# Patient Record
Sex: MALE | Race: WHITE | Employment: FULL TIME | URBAN - METROPOLITAN AREA
[De-identification: names, ages, dates, MRNs, and addresses within clinical notes are randomized per-mention and may not be internally consistent; named-entity substitution may affect disease eponyms.]

---

## 2023-04-25 ENCOUNTER — APPOINTMENT (OUTPATIENT)
Dept: RADIOLOGY | Facility: MEDICAL CENTER | Age: 73
DRG: 123 | End: 2023-04-25
Attending: EMERGENCY MEDICINE
Payer: COMMERCIAL

## 2023-04-25 ENCOUNTER — HOSPITAL ENCOUNTER (INPATIENT)
Facility: MEDICAL CENTER | Age: 73
LOS: 1 days | DRG: 123 | End: 2023-04-26
Attending: EMERGENCY MEDICINE | Admitting: FAMILY MEDICINE
Payer: COMMERCIAL

## 2023-04-25 ENCOUNTER — APPOINTMENT (OUTPATIENT)
Dept: CARDIOLOGY | Facility: MEDICAL CENTER | Age: 73
DRG: 123 | End: 2023-04-25
Attending: STUDENT IN AN ORGANIZED HEALTH CARE EDUCATION/TRAINING PROGRAM
Payer: COMMERCIAL

## 2023-04-25 ENCOUNTER — APPOINTMENT (OUTPATIENT)
Dept: RADIOLOGY | Facility: MEDICAL CENTER | Age: 73
DRG: 123 | End: 2023-04-25
Attending: STUDENT IN AN ORGANIZED HEALTH CARE EDUCATION/TRAINING PROGRAM
Payer: COMMERCIAL

## 2023-04-25 DIAGNOSIS — H34.11 CENTRAL RETINAL ARTERY OCCLUSION OF RIGHT EYE: ICD-10-CM

## 2023-04-25 PROBLEM — K21.9 CHRONIC GERD: Status: ACTIVE | Noted: 2023-04-25

## 2023-04-25 PROBLEM — I10 ESSENTIAL HYPERTENSION: Status: ACTIVE | Noted: 2023-04-25

## 2023-04-25 PROBLEM — H34.10 CENTRAL RETINAL ARTERY OCCLUSION: Status: ACTIVE | Noted: 2023-04-25

## 2023-04-25 LAB
ALBUMIN SERPL BCP-MCNC: 4.4 G/DL (ref 3.2–4.9)
ALBUMIN/GLOB SERPL: 1.7 G/DL
ALP SERPL-CCNC: 87 U/L (ref 30–99)
ALT SERPL-CCNC: 20 U/L (ref 2–50)
ANION GAP SERPL CALC-SCNC: 11 MMOL/L (ref 7–16)
APTT PPP: 26.3 SEC (ref 24.7–36)
AST SERPL-CCNC: 26 U/L (ref 12–45)
BASOPHILS # BLD AUTO: 0.5 % (ref 0–1.8)
BASOPHILS # BLD: 0.03 K/UL (ref 0–0.12)
BILIRUB SERPL-MCNC: 0.6 MG/DL (ref 0.1–1.5)
BUN SERPL-MCNC: 13 MG/DL (ref 8–22)
CALCIUM ALBUM COR SERPL-MCNC: 9.2 MG/DL (ref 8.5–10.5)
CALCIUM SERPL-MCNC: 9.5 MG/DL (ref 8.5–10.5)
CHLORIDE SERPL-SCNC: 106 MMOL/L (ref 96–112)
CO2 SERPL-SCNC: 21 MMOL/L (ref 20–33)
CREAT SERPL-MCNC: 0.82 MG/DL (ref 0.5–1.4)
CRP SERPL HS-MCNC: <0.3 MG/DL (ref 0–0.75)
EKG IMPRESSION: NORMAL
EOSINOPHIL # BLD AUTO: 0.25 K/UL (ref 0–0.51)
EOSINOPHIL NFR BLD: 4.1 % (ref 0–6.9)
ERYTHROCYTE [DISTWIDTH] IN BLOOD BY AUTOMATED COUNT: 43.8 FL (ref 35.9–50)
EST. AVERAGE GLUCOSE BLD GHB EST-MCNC: 120 MG/DL
GFR SERPLBLD CREATININE-BSD FMLA CKD-EPI: 94 ML/MIN/1.73 M 2
GLOBULIN SER CALC-MCNC: 2.6 G/DL (ref 1.9–3.5)
GLUCOSE SERPL-MCNC: 94 MG/DL (ref 65–99)
HBA1C MFR BLD: 5.8 % (ref 4–5.6)
HCT VFR BLD AUTO: 44.1 % (ref 42–52)
HGB BLD-MCNC: 14.3 G/DL (ref 14–18)
IMM GRANULOCYTES # BLD AUTO: 0.03 K/UL (ref 0–0.11)
IMM GRANULOCYTES NFR BLD AUTO: 0.5 % (ref 0–0.9)
INR PPP: 1.08 (ref 0.87–1.13)
LV EJECT FRACT  99904: 55
LYMPHOCYTES # BLD AUTO: 2.39 K/UL (ref 1–4.8)
LYMPHOCYTES NFR BLD: 39 % (ref 22–41)
MCH RBC QN AUTO: 30.5 PG (ref 27–33)
MCHC RBC AUTO-ENTMCNC: 32.4 G/DL (ref 33.7–35.3)
MCV RBC AUTO: 94 FL (ref 81.4–97.8)
MONOCYTES # BLD AUTO: 0.6 K/UL (ref 0–0.85)
MONOCYTES NFR BLD AUTO: 9.8 % (ref 0–13.4)
NEUTROPHILS # BLD AUTO: 2.83 K/UL (ref 1.82–7.42)
NEUTROPHILS NFR BLD: 46.1 % (ref 44–72)
NRBC # BLD AUTO: 0 K/UL
NRBC BLD-RTO: 0 /100 WBC
PLATELET # BLD AUTO: 239 K/UL (ref 164–446)
PMV BLD AUTO: 9.2 FL (ref 9–12.9)
POTASSIUM SERPL-SCNC: 4.5 MMOL/L (ref 3.6–5.5)
PROT SERPL-MCNC: 7 G/DL (ref 6–8.2)
PROTHROMBIN TIME: 13.9 SEC (ref 12–14.6)
RBC # BLD AUTO: 4.69 M/UL (ref 4.7–6.1)
SODIUM SERPL-SCNC: 138 MMOL/L (ref 135–145)
WBC # BLD AUTO: 6.1 K/UL (ref 4.8–10.8)

## 2023-04-25 PROCEDURE — 93306 TTE W/DOPPLER COMPLETE: CPT | Mod: 26 | Performed by: INTERNAL MEDICINE

## 2023-04-25 PROCEDURE — 93306 TTE W/DOPPLER COMPLETE: CPT

## 2023-04-25 PROCEDURE — 36415 COLL VENOUS BLD VENIPUNCTURE: CPT

## 2023-04-25 PROCEDURE — 770020 HCHG ROOM/CARE - TELE (206)

## 2023-04-25 PROCEDURE — 85730 THROMBOPLASTIN TIME PARTIAL: CPT

## 2023-04-25 PROCEDURE — A9270 NON-COVERED ITEM OR SERVICE: HCPCS | Performed by: STUDENT IN AN ORGANIZED HEALTH CARE EDUCATION/TRAINING PROGRAM

## 2023-04-25 PROCEDURE — 700117 HCHG RX CONTRAST REV CODE 255: Performed by: EMERGENCY MEDICINE

## 2023-04-25 PROCEDURE — 85652 RBC SED RATE AUTOMATED: CPT

## 2023-04-25 PROCEDURE — 80053 COMPREHEN METABOLIC PANEL: CPT

## 2023-04-25 PROCEDURE — 700102 HCHG RX REV CODE 250 W/ 637 OVERRIDE(OP): Performed by: STUDENT IN AN ORGANIZED HEALTH CARE EDUCATION/TRAINING PROGRAM

## 2023-04-25 PROCEDURE — 700105 HCHG RX REV CODE 258: Performed by: STUDENT IN AN ORGANIZED HEALTH CARE EDUCATION/TRAINING PROGRAM

## 2023-04-25 PROCEDURE — 70496 CT ANGIOGRAPHY HEAD: CPT

## 2023-04-25 PROCEDURE — 70498 CT ANGIOGRAPHY NECK: CPT

## 2023-04-25 PROCEDURE — 70551 MRI BRAIN STEM W/O DYE: CPT

## 2023-04-25 PROCEDURE — 85025 COMPLETE CBC W/AUTO DIFF WBC: CPT

## 2023-04-25 PROCEDURE — 86140 C-REACTIVE PROTEIN: CPT

## 2023-04-25 PROCEDURE — 83036 HEMOGLOBIN GLYCOSYLATED A1C: CPT

## 2023-04-25 PROCEDURE — 85610 PROTHROMBIN TIME: CPT

## 2023-04-25 PROCEDURE — 93005 ELECTROCARDIOGRAM TRACING: CPT | Performed by: EMERGENCY MEDICINE

## 2023-04-25 PROCEDURE — 99285 EMERGENCY DEPT VISIT HI MDM: CPT

## 2023-04-25 RX ORDER — PANTOPRAZOLE SODIUM 40 MG/1
40 TABLET, DELAYED RELEASE ORAL DAILY
COMMUNITY

## 2023-04-25 RX ORDER — ONDANSETRON 4 MG/1
4 TABLET, ORALLY DISINTEGRATING ORAL EVERY 4 HOURS PRN
Status: DISCONTINUED | OUTPATIENT
Start: 2023-04-25 | End: 2023-04-26 | Stop reason: HOSPADM

## 2023-04-25 RX ORDER — LISINOPRIL 10 MG/1
10 TABLET ORAL
Status: DISCONTINUED | OUTPATIENT
Start: 2023-04-25 | End: 2023-04-26 | Stop reason: HOSPADM

## 2023-04-25 RX ORDER — OMEPRAZOLE 20 MG/1
20 CAPSULE, DELAYED RELEASE ORAL DAILY
Status: DISCONTINUED | OUTPATIENT
Start: 2023-04-26 | End: 2023-04-26 | Stop reason: HOSPADM

## 2023-04-25 RX ORDER — CLOPIDOGREL BISULFATE 75 MG/1
75 TABLET ORAL DAILY
COMMUNITY

## 2023-04-25 RX ORDER — PANTOPRAZOLE SODIUM 40 MG/1
40 TABLET, DELAYED RELEASE ORAL DAILY
Status: DISCONTINUED | OUTPATIENT
Start: 2023-04-25 | End: 2023-04-25

## 2023-04-25 RX ORDER — ONDANSETRON 4 MG/1
4 TABLET, ORALLY DISINTEGRATING ORAL 2 TIMES DAILY PRN
COMMUNITY

## 2023-04-25 RX ORDER — POLYETHYLENE GLYCOL 3350 17 G/17G
1 POWDER, FOR SOLUTION ORAL
Status: DISCONTINUED | OUTPATIENT
Start: 2023-04-25 | End: 2023-04-26 | Stop reason: HOSPADM

## 2023-04-25 RX ORDER — ROSUVASTATIN CALCIUM 20 MG/1
40 TABLET, COATED ORAL DAILY
Status: DISCONTINUED | OUTPATIENT
Start: 2023-04-25 | End: 2023-04-26 | Stop reason: HOSPADM

## 2023-04-25 RX ORDER — QUINAPRIL 5 1/1
5 TABLET ORAL 2 TIMES DAILY
Status: ON HOLD | COMMUNITY
End: 2023-04-26 | Stop reason: SDUPTHER

## 2023-04-25 RX ORDER — ONDANSETRON 2 MG/ML
4 INJECTION INTRAMUSCULAR; INTRAVENOUS EVERY 4 HOURS PRN
Status: DISCONTINUED | OUTPATIENT
Start: 2023-04-25 | End: 2023-04-26 | Stop reason: HOSPADM

## 2023-04-25 RX ORDER — OXYCODONE HCL 20 MG/1
20 TABLET, FILM COATED, EXTENDED RELEASE ORAL EVERY 12 HOURS
COMMUNITY

## 2023-04-25 RX ORDER — LABETALOL HYDROCHLORIDE 5 MG/ML
10 INJECTION, SOLUTION INTRAVENOUS EVERY 4 HOURS PRN
Status: DISCONTINUED | OUTPATIENT
Start: 2023-04-25 | End: 2023-04-26 | Stop reason: HOSPADM

## 2023-04-25 RX ORDER — OXYCODONE HYDROCHLORIDE 10 MG/1
10-20 TABLET ORAL EVERY 4 HOURS PRN
Status: DISCONTINUED | OUTPATIENT
Start: 2023-04-25 | End: 2023-04-26 | Stop reason: HOSPADM

## 2023-04-25 RX ORDER — OXYCODONE HYDROCHLORIDE 10 MG/1
10-20 TABLET ORAL EVERY 4 HOURS PRN
COMMUNITY

## 2023-04-25 RX ORDER — AMOXICILLIN 250 MG
2 CAPSULE ORAL 2 TIMES DAILY
Status: DISCONTINUED | OUTPATIENT
Start: 2023-04-25 | End: 2023-04-26 | Stop reason: HOSPADM

## 2023-04-25 RX ORDER — BISACODYL 10 MG
10 SUPPOSITORY, RECTAL RECTAL
Status: DISCONTINUED | OUTPATIENT
Start: 2023-04-25 | End: 2023-04-26 | Stop reason: HOSPADM

## 2023-04-25 RX ORDER — SODIUM CHLORIDE, SODIUM LACTATE, POTASSIUM CHLORIDE, CALCIUM CHLORIDE 600; 310; 30; 20 MG/100ML; MG/100ML; MG/100ML; MG/100ML
INJECTION, SOLUTION INTRAVENOUS CONTINUOUS
Status: DISCONTINUED | OUTPATIENT
Start: 2023-04-25 | End: 2023-04-25

## 2023-04-25 RX ORDER — OXYCODONE HCL 20 MG/1
20 TABLET, FILM COATED, EXTENDED RELEASE ORAL EVERY 12 HOURS
Status: DISCONTINUED | OUTPATIENT
Start: 2023-04-25 | End: 2023-04-26 | Stop reason: HOSPADM

## 2023-04-25 RX ORDER — ROSUVASTATIN CALCIUM 40 MG/1
40 TABLET, COATED ORAL DAILY
COMMUNITY

## 2023-04-25 RX ORDER — CLOPIDOGREL BISULFATE 75 MG/1
75 TABLET ORAL DAILY
Status: DISCONTINUED | OUTPATIENT
Start: 2023-04-26 | End: 2023-04-26 | Stop reason: HOSPADM

## 2023-04-25 RX ORDER — QUINAPRIL 5 1/1
5 TABLET ORAL 2 TIMES DAILY
Status: DISCONTINUED | OUTPATIENT
Start: 2023-04-25 | End: 2023-04-25

## 2023-04-25 RX ORDER — CELECOXIB 200 MG/1
200 CAPSULE ORAL 2 TIMES DAILY PRN
COMMUNITY

## 2023-04-25 RX ADMIN — OXYCODONE HYDROCHLORIDE 20 MG: 20 TABLET, FILM COATED, EXTENDED RELEASE ORAL at 18:01

## 2023-04-25 RX ADMIN — IOHEXOL 100 ML: 350 INJECTION, SOLUTION INTRAVENOUS at 13:52

## 2023-04-25 RX ADMIN — LISINOPRIL 10 MG: 10 TABLET ORAL at 17:01

## 2023-04-25 RX ADMIN — SODIUM CHLORIDE, POTASSIUM CHLORIDE, SODIUM LACTATE AND CALCIUM CHLORIDE: 600; 310; 30; 20 INJECTION, SOLUTION INTRAVENOUS at 16:55

## 2023-04-25 RX ADMIN — OXYCODONE HYDROCHLORIDE 20 MG: 10 TABLET ORAL at 21:10

## 2023-04-25 RX ADMIN — ROSUVASTATIN CALCIUM 40 MG: 20 TABLET, FILM COATED ORAL at 17:01

## 2023-04-25 ASSESSMENT — PATIENT HEALTH QUESTIONNAIRE - PHQ9
1. LITTLE INTEREST OR PLEASURE IN DOING THINGS: NOT AT ALL
SUM OF ALL RESPONSES TO PHQ9 QUESTIONS 1 AND 2: 0
2. FEELING DOWN, DEPRESSED, IRRITABLE, OR HOPELESS: NOT AT ALL

## 2023-04-25 ASSESSMENT — LIFESTYLE VARIABLES
TOTAL SCORE: 0
DOES PATIENT WANT TO STOP DRINKING: NO
CONSUMPTION TOTAL: NEGATIVE
AVERAGE NUMBER OF DAYS PER WEEK YOU HAVE A DRINK CONTAINING ALCOHOL: 0
EVER FELT BAD OR GUILTY ABOUT YOUR DRINKING: NO
ON A TYPICAL DAY WHEN YOU DRINK ALCOHOL HOW MANY DRINKS DO YOU HAVE: 0
EVER HAD A DRINK FIRST THING IN THE MORNING TO STEADY YOUR NERVES TO GET RID OF A HANGOVER: NO
HAVE YOU EVER FELT YOU SHOULD CUT DOWN ON YOUR DRINKING: NO
HAVE PEOPLE ANNOYED YOU BY CRITICIZING YOUR DRINKING: NO
HOW MANY TIMES IN THE PAST YEAR HAVE YOU HAD 5 OR MORE DRINKS IN A DAY: 0
TOTAL SCORE: 0
TOTAL SCORE: 0
ALCOHOL_USE: NO

## 2023-04-25 ASSESSMENT — COGNITIVE AND FUNCTIONAL STATUS - GENERAL
DAILY ACTIVITIY SCORE: 24
SUGGESTED CMS G CODE MODIFIER DAILY ACTIVITY: CH
SUGGESTED CMS G CODE MODIFIER MOBILITY: CH
MOBILITY SCORE: 24

## 2023-04-25 ASSESSMENT — PAIN DESCRIPTION - PAIN TYPE
TYPE: ACUTE PAIN
TYPE: CHRONIC PAIN

## 2023-04-25 ASSESSMENT — FIBROSIS 4 INDEX
FIB4 SCORE: 1.73
FIB4 SCORE: 1.73

## 2023-04-25 NOTE — ED NOTES
Break RN:  Pt given water per ERP and is tolerating well. Pt also updated regarding plan of care and pending admission to hospital, demonstrated understanding.

## 2023-04-25 NOTE — ED NOTES
Report to RALPH Abel. Plan for patient to go to MRI first then [id number]. Patient agreeable to plan.

## 2023-04-25 NOTE — ED TRIAGE NOTES
"Chief Complaint   Patient presents with    Sent by MD            Pt reports that he was sent by his opthalmologic for decreased vision in right eye. Reports that she saw a \"blocked artery that was either a stroke or going to be a stroke.\"  Pt reports symptoms have been present 1-2 months.  Reports no other neuro deficits. No unilateral weakness.   /77   Pulse 68   Temp 36.7 °C (98.1 °F) (Temporal)   Resp 14   Wt 101 kg (223 lb 5.2 oz)   SpO2 95%   Pt informed of wait times. Educated on triage process.  Asked to return to triage RN for any new or worsening of symptoms. Thanked for patience.      "

## 2023-04-25 NOTE — H&P
"[facility] MEDICINE HISTORY AND PHYSICAL     PATIENT ID:  NAME:  William Serrano  MRN:               [id number]  YOB: 1950    Date of Admission: 4/25/2023     Attending: Remigio Hernandez MD    Resident: Brittany Sutton MD     Primary Care Physician:  Pcp Pt States None    CC: Vision loss    HPI: William Serrano is a 72 y.o. male with past medical history significant for cataracts, retinal detachment x3, left retinal hemorrhage, L eye blindness, drusen of both optic discs, HC, HTN, and GERD admitted on 4/25/2023 for decreased vision in right eye secondary to retinal artery branch occlusion (CRAO) versus branch retinal artery occlusion (BRAO) versus giant cell arteritis.     Patient has a history of being blind in his left eye. Additionally, he is 40% vision in his right eye and describes his vision as \"a tunnel.\"  Over the last 1 to 2 months, he has had changes in his right eye vision which includes an inability to see distance and all reading.  This acute change in his vision prompted an appointment with his ophthalmologist yesterday.  His ophthalmology recommended he be evaluated in the emergency department yesterday as she was conerned for CRAO versus BRAO \"due to retinal whitening note on exam, with edema, also includes GCA as patient reports increased headaches\" (oer Dr. Menjivar office visit note). He says that he has constant headaches because of his poor vision and the headaches worsen while he attempts to read.  He denies any eye pain.  However, reports nausea when trying to read.  He says he takes Zofran at home for the nausea.  He denies fever, chills, night sweats, unexpected weight loss, diarrhea constipation and vomiting.    The follow exams were completed by the patient's ophthalmologist, Dr. Menjivar:  \"Slit Lamp Exam   Right Left   Lids/Lashes Normal Normal   Conjunctiva/Sclera White and quiet White and quiet   Cornea Clear Clear   Anterior Chamber Deep and quiet, no cell Deep and quiet, " "no cell   Iris Round and reactive, no neovascularization Round and reactive   Lens subluxed superior PCO 2+ subluxed inferior, Posterior capsular opacification trace   Anterior Vitreous Normal, no cells Normal, no cells   Fundus Exam   Right Left   Disc drusen, Peripapillary atrophy Pallor, Peripapillary atrophy   C/D Ratio elevated elevated   Macula Retinal pigment epithelial mottling, elevation, slight pallor with edema, microaneurysms, possible choroidal neovascularization significant distortion, RPE atrophy; large central heme   Vessels Microaneurysms superior arcade, Arteriolar narrowing Arteriolar narrowing   Periphery scleral buckle scleral buckle\"    ERCourse:  Patient was evaluated in the ER for CRAO identified on funduscopic examination.  He underwent CTA to evaluate for possible lead point for stroke in his head and neck which are within normal limits.  CBC and CMP were reassuring.  EKG revealed normal sinus rhythm.  Patient admitted for echocardiogram and ongoing telemetry monitoring.    REVIEW OF SYSTEMS:   Ten systems reviewed and were negative except as noted in the HPI.                PAST MEDICAL HISTORY:  Past Medical History:   Diagnosis Date    Retinal detachment        PAST SURGICAL HISTORY:  History reviewed. No pertinent surgical history.    FAMILY HISTORY:  History reviewed. No pertinent family history.    SOCIAL HISTORY:   Social History:   Tobacco: Denied  Alcohol: Denied  Recreational drugs (illegal and prescription): Denied  Employment: Patient works remotely in [place], [place].  Activity Level: Independent with activities of daily living.  Living situation: He lives with his wife who has breast cancer in [place].  Primary Care Provider: Denied    DIET:   Orders Placed This Encounter   Procedures    Diet NPO Restrict to: Sips with Medications     Standing Status:   Standing     Number of Occurrences:   1     Order Specific Question:   Diet NPO Restrict to:     Answer:   Sips with " Medications [3]       ALLERGIES:  No Known Allergies    OUTPATIENT MEDICATIONS:    Current Facility-Administered Medications:     senna-docusate (PERICOLACE or SENOKOT S) 8.6-50 MG per tablet 2 Tablet, 2 Tablet, Oral, BID **AND** polyethylene glycol/lytes (MIRALAX) PACKET 1 Packet, 1 Packet, Oral, QDAY PRN **AND** magnesium hydroxide (MILK OF MAGNESIA) suspension 30 mL, 30 mL, Oral, QDAY PRN **AND** bisacodyl (DULCOLAX) suppository 10 mg, 10 mg, Rectal, QDAY PRN, Remigio Hernandez M.D.    ondansetron (ZOFRAN) syringe/vial injection 4 mg, 4 mg, Intravenous, Q4HRS PRN, Remigio Hernandez M.D.    ondansetron (ZOFRAN ODT) dispertab 4 mg, 4 mg, Oral, Q4HRS PRN, Remigio Hernandez M.D.    labetalol (NORMODYNE/TRANDATE) injection 10 mg, 10 mg, Intravenous, Q4HRS PRN, Remigio Hernandez M.D.    rosuvastatin (CRESTOR) tablet 40 mg, 40 mg, Oral, DAILY, Brittany Sutton M.D.    [START ON 4/26/2023] clopidogrel (PLAVIX) tablet 75 mg, 75 mg, Oral, DAILY, Brittany Sutton M.D.    [START ON 4/26/2023] omeprazole (PRILOSEC) capsule 20 mg, 20 mg, Oral, DAILY, Brittany Sutton M.D.    lisinopril (PRINIVIL) tablet 10 mg, 10 mg, Oral, Q DAY, Brittany Sutton M.D.    lactated ringers infusion, , Intravenous, Continuous, Brittany Sutton M.D.    Current Outpatient Medications:     multivitamin Tab, Take 1 Tablet by mouth every day., Disp: , Rfl:     Multiple Vitamins-Minerals (PRESERVISION AREDS PO), Take 1 Tablet by mouth every evening., Disp: , Rfl:     clopidogrel (PLAVIX) 75 MG Tab, Take 75 mg by mouth every day., Disp: , Rfl:     oxyCODONE immediate release (ROXICODONE) 10 MG immediate release tablet, Take 10-20 mg by mouth every four hours as needed for Moderate Pain., Disp: , Rfl:     oxyCODONE CR (OXYCONTIN) 20 MG Tablet Extended Release 12 hour Abuse-Deterrent, Take 20 mg by mouth every 12 hours., Disp: , Rfl:     celecoxib (CELEBREX) 200 MG Cap, Take 200 mg by mouth 2 times a day as needed (Pain)., Disp: , Rfl:      ondansetron (ZOFRAN ODT) 4 MG TABLET DISPERSIBLE, Take 4 mg by mouth 2 times a day as needed for Nausea/Vomiting., Disp: , Rfl:     pantoprazole (PROTONIX) 40 MG Tablet Delayed Response, Take 40 mg by mouth every day., Disp: , Rfl:     rosuvastatin (CRESTOR) 40 MG tablet, Take 40 mg by mouth every day., Disp: , Rfl:     quinapril (ACCUPRIL) 5 MG Tab, Take 5 mg by mouth 2 times a day., Disp: , Rfl:     PHYSICAL EXAM:  Vitals:    23 1032 23 1035 23 1401 23 1500   BP: 135/77  (!) 151/68 (!) 144/67   Pulse: 68  63 62   Resp: 14  16 16   Temp: 36.7 °C (98.1 °F)      TempSrc: Temporal      SpO2: 95%  92% 93%   Weight:  101 kg (223 lb 5.2 oz)     , Temp (24hrs), Av.7 °C (98.1 °F), Min:36.7 °C (98.1 °F), Max:36.7 °C (98.1 °F)  , Pulse Oximetry: 93 %, O2 (LPM): 0, O2 Delivery Device: None - Room Air    Physical Exam  Constitutional:       General: He is not in acute distress.     Appearance: Normal appearance. He is normal weight. He is not ill-appearing.   HENT:      Head: Atraumatic.   Eyes:      General: Visual field deficit present.   Cardiovascular:      Rate and Rhythm: Normal rate and regular rhythm.      Heart sounds: Normal heart sounds.   Pulmonary:      Effort: Pulmonary effort is normal. No respiratory distress.      Breath sounds: Normal breath sounds.   Abdominal:      General: Abdomen is flat. Bowel sounds are normal.      Palpations: Abdomen is soft.   Skin:     General: Skin is warm and dry.   Neurological:      Mental Status: He is alert.      Cranial Nerves: No cranial nerve deficit, dysarthria or facial asymmetry.      Sensory: Sensation is intact.      Motor: Motor function is intact.      Coordination: Coordination normal. Heel to Shin Test normal.      Comments: NIH 1: For vision loss.            LAB TESTS:   Recent Labs     23  1350   WBC 6.1   RBC 4.69*   HEMOGLOBIN 14.3   HEMATOCRIT 44.1   MCV 94.0   MCH 30.5   RDW 43.8   PLATELETCT 239   MPV 9.2   NEUTSPOLYS 46.10    LYMPHOCYTES 39.00   MONOCYTES 9.80   EOSINOPHILS 4.10   BASOPHILS 0.50         Recent Labs     04/25/23  1232   SODIUM 138   POTASSIUM 4.5   CHLORIDE 106   CO2 21   BUN 13   CREATININE 0.82   CALCIUM 9.5   ALBUMIN 4.4       CULTURES:   Results       ** No results found for the last 168 hours. **            IMAGES:  EC-ECHOCARDIOGRAM COMPLETE W/O CONT         CT-CTA NECK WITH & W/O-POST PROCESSING   Final Result      CT angiogram of the neck within normal limits.      CT-CTA HEAD WITH & W/O-POST PROCESS   Final Result      CT angiogram of the [place] within normal limits.      MR-BRAIN-W/O    (Results Pending)       CONSULTS:   Neurology    ASSESSMENT/PLAN:   William Serrano is a 72 y.o. male with past medical history significant for cataracts, retinal detachment x3, left retinal hemorrhage, L eye blindness, drusen of both optic discs, HC, HTN, and GERD admitted on 4/25/2023 for decreased vision in right eye secondary to retinal artery branch occlusion (CRAO) versus branch retinal artery occlusion (BRAO) versus giant cell arteritis.     * Central retinal artery occlusion- (present on admission)  Assessment & Plan  As per ophthalmology, she is concern for retinal artery branch occlusion of right eye versus possible BRAO due to retinal whitening on exam with edema.  Lead EKG revealed NSR.  CT angiogram of the [place] within normal limits. CT angiogram of the neck within normal limits.  NIH score 1.  - ESR, CRP, CBC (r/o GCA), A1c, PT/PTT ordered per ophthalmology.  Will review upon return.  - MRI brain without ordered.  Will review upon return.  - Echocardiogram ordered.  Will review upon return.  - Neurology consultation pending MRI result.       Chronic GERD  Assessment & Plan  - Continue home pantoprazole    Essential hypertension  Assessment & Plan  Chart review, the patient stopped his blood pressure medication recently and has not seen his primary care physician in over a year.  Blood pressure  is elevated in the emergency department.  - Restart 5 mg of Accupril twice daily      Core Measures:   Fluids: LR @ 125  Lines: IV  Abx: None  DVT prophylaxis: SCD  Code Status: DNR DNI    Brittany Sutton MD  PGY-2  [facility]      This case was discussed with Dr. Remigio Hernandez with [facility]

## 2023-04-25 NOTE — ASSESSMENT & PLAN NOTE
As per ophthalmology, she is concern for retinal artery branch occlusion of right eye versus possible BRAO due to retinal whitening on exam with edema.  Lead EKG revealed NSR.  CT angiogram of the [place] within normal limits. CT angiogram of the neck within normal limits.  NIH score 1. Decreased concern for giant cell arteritis and hypercoagulability based on results of blood work.  - Echocardiogram WNL   - Brain MRI WO revealed no acute abnormality.  - ESR WNL, CRP WNL, CBC WNL, PT/PTT WNL.  - Patient to be referred to outpatient cardiology to discuss Zio patch to assess for AF not captured while in patient  -ABCD2 score 3 points: Low risk. 2-day stroke risk 1%/7 day stroke risk: 1.2%/ 90-day stroke risk 3.1%. Single-agent antiplatelet therapy. As such, will continue home clopidogrel.

## 2023-04-25 NOTE — ED NOTES
Break RN:  Pt back from CT via zandrarlawson. No distress noted and pt's breaths are even and unlabored.

## 2023-04-25 NOTE — CARE PLAN
Problem: Knowledge Deficit - Stroke Education  Goal: Patient's knowledge of stroke and risk factors will improve  Outcome: Progressing   The patient is Stable - Low risk of patient condition declining or worsening    Shift Goals  Clinical Goals: monitor VS  Patient Goals: rest    Progress made toward(s) clinical / shift goals:  Pt educated on POC, all questions answered in regards to disease process, treatment and diet. Pt verbalized understanding and voiced no further questions at this time.

## 2023-04-25 NOTE — ED PROVIDER NOTES
ED Provider Note    CHIEF COMPLAINT  Chief Complaint   Patient presents with    Sent by MD CA/CHITRA  LIMITATION TO HISTORY   none      William Serrano is a 72 y.o. male who presents to in by his ophthalmologist for CRAO.  Patient reports that he has an extensive history of ophthalmologic issues.  He has had bilateral retinal detachment status post repair.  He has apparently calcifications on his retinal nerve causing central blindness in his left eye, and was seen by his ophthalmologist for decreased peripheral vision in his right eye yesterday.  On funduscopic exam patient apparently had findings concerning for a central retinal artery occlusion and patient was told to go directly to the emergency department for further care, however patient did not want to go immediately to the emergency department as he had some dizziness to take care of.  He therefore presents today.  Patient reports decreased vision in the entire periphery of his left eye which is new over the last week or so.  Patient denies any associated weakness or numbness.  He denies any difficulty ambulating.  Patient denies any associated dizziness.    PAST MEDICAL HISTORY   has a past medical history of Retinal detachment.    SURGICAL HISTORY  patient denies any surgical history    FAMILY HISTORY  History reviewed. No pertinent family history.    SOCIAL HISTORY  Social History     Tobacco Use    Smoking status: Never    Smokeless tobacco: Never   Substance and Sexual Activity    Alcohol use: Never    Drug use: Never    Sexual activity: Not on file       CURRENT MEDICATIONS  Home Medications       Reviewed by Andreea Scott RSID (Registered Nurse) on 04/25/23 at 1040  Med List Status: Partial     Medication Last Dose Status        Patient Ash Taking any Medications                           ALLERGIES  No Known Allergies    PHYSICAL EXAM  VITAL SIGNS: /77   Pulse 68   Temp 36.7 °C (98.1 °F) (Temporal)   Resp 14   Wt  101 kg (223 lb 5.2 oz)   SpO2 95%    Physical Exam  Constitutional:       Appearance: Normal appearance.   HENT:      Head: Normocephalic.      Right Ear: Tympanic membrane normal.      Left Ear: Tympanic membrane normal.      Nose: Nose normal.      Mouth/Throat:      Mouth: Mucous membranes are moist.   Eyes:      Extraocular Movements: Extraocular movements intact.      Pupils: Pupils are equal, round, and reactive to light.      Comments: Vision difficulty affected in left lateral quadrants  No strabismus, conjunctiva is normal   Cardiovascular:      Rate and Rhythm: Normal rate and regular rhythm.   Pulmonary:      Effort: Pulmonary effort is normal. No respiratory distress.      Breath sounds: Normal breath sounds. No stridor. No wheezing or rales.   Chest:      Chest wall: No tenderness.   Abdominal:      General: Abdomen is flat. There is no distension.      Palpations: Abdomen is soft. There is no mass.      Tenderness: There is no abdominal tenderness.   Musculoskeletal:      Cervical back: Normal range of motion.   Skin:     General: Skin is warm.      Capillary Refill: Capillary refill takes less than 2 seconds.   Neurological:      General: No focal deficit present.      Mental Status: He is alert and oriented to person, place, and time.   Psychiatric:         Mood and Affect: Mood normal.         DIAGNOSTIC STUDIES / PROCEDURES  EKG  I have independently interpreted this EKG  Ekg is unremarkable    LABS  Results for orders placed or performed during the hospital encounter of 04/25/23   CMP   Result Value Ref Range    Sodium 138 135 - 145 mmol/L    Potassium 4.5 3.6 - 5.5 mmol/L    Chloride 106 96 - 112 mmol/L    Co2 21 20 - 33 mmol/L    Anion Gap 11.0 7.0 - 16.0    Glucose 94 65 - 99 mg/dL    Bun 13 8 - 22 mg/dL    Creatinine 0.82 0.50 - 1.40 mg/dL    Calcium 9.5 8.5 - 10.5 mg/dL    AST(SGOT) 26 12 - 45 U/L    ALT(SGPT) 20 2 - 50 U/L    Alkaline Phosphatase 87 30 - 99 U/L    Total Bilirubin 0.6 0.1 -  1.5 mg/dL    Albumin 4.4 3.2 - 4.9 g/dL    Total Protein 7.0 6.0 - 8.2 g/dL    Globulin 2.6 1.9 - 3.5 g/dL    A-G Ratio 1.7 g/dL   CORRECTED CALCIUM   Result Value Ref Range    Correct Calcium 9.2 8.5 - 10.5 mg/dL   ESTIMATED GFR   Result Value Ref Range    GFR (CKD-EPI) 94 >60 mL/min/1.73 m 2   CBC WITH DIFFERENTIAL   Result Value Ref Range    WBC 6.1 4.8 - 10.8 K/uL    RBC 4.69 (L) 4.70 - 6.10 M/uL    Hemoglobin 14.3 14.0 - 18.0 g/dL    Hematocrit 44.1 42.0 - 52.0 %    MCV 94.0 81.4 - 97.8 fL    MCH 30.5 27.0 - 33.0 pg    MCHC 32.4 (L) 33.7 - 35.3 g/dL    RDW 43.8 35.9 - 50.0 fL    Platelet Count 239 164 - 446 K/uL    MPV 9.2 9.0 - 12.9 fL    Neutrophils-Polys 46.10 44.00 - 72.00 %    Lymphocytes 39.00 22.00 - 41.00 %    Monocytes 9.80 0.00 - 13.40 %    Eosinophils 4.10 0.00 - 6.90 %    Basophils 0.50 0.00 - 1.80 %    Immature Granulocytes 0.50 0.00 - 0.90 %    Nucleated RBC 0.00 /100 WBC    Neutrophils (Absolute) 2.83 1.82 - 7.42 K/uL    Lymphs (Absolute) 2.39 1.00 - 4.80 K/uL    Monos (Absolute) 0.60 0.00 - 0.85 K/uL    Eos (Absolute) 0.25 0.00 - 0.51 K/uL    Baso (Absolute) 0.03 0.00 - 0.12 K/uL    Immature Granulocytes (abs) 0.03 0.00 - 0.11 K/uL    NRBC (Absolute) 0.00 K/uL   CRP QUANTITIVE (NON-CARDIAC)   Result Value Ref Range    Stat C-Reactive Protein <0.30 0.00 - 0.75 mg/dL   EKG   Result Value Ref Range    Report       [facility] Emergency Dept.    Test Date:  2023  Pt Name:    ANNA FONTENOT               Department: ER  MRN:        [id number]                      Room:       [facility]  Gender:     Male                         Technician: [id number]  :        1950                   Requested By:TI ROTHMAN  Order #:    [id number]                    Reading MD: Deonna Jarrett MD    Measurements  Intervals                                Axis  Rate:       59                           P:          23  FL:         230                          QRS:        14  QRSD:       99                            T:          6  QT:         415  QTc:        412    Interpretive Statements  EKG is normal sinus rhythm, normal axis normal intervals no ST changes  consistent with acute regional ischemia  Electronically Signed On 4- 14:01:04 PDT by Deonna Jarrett MD           RADIOLOGY  I have independently interpreted the diagnostic imaging associated with this visit and am waiting the final reading from the radiologist.   My preliminary interpretation is as follows: unremarkable  Radiologist interpretation:   CT-CTA NECK WITH & W/O-POST PROCESSING   Final Result      CT angiogram of the neck within normal limits.      CT-CTA HEAD WITH & W/O-POST PROCESS   Final Result      CT angiogram of the [place] within normal limits.      MR-BRAIN-W/O    (Results Pending)   EC-ECHOCARDIOGRAM COMPLETE W/O CONT    (Results Pending)         COURSE & MEDICAL DECISION MAKING    INITIAL ASSESSMENT, COURSE AND PLAN  Care Narrative: Patient here with CRAO identified on funduscopic exam.  Will check CTA to evaluate for possible lead point for stroke and ensure patient does not have any critical stenosis of his carotid arteries or vascular abnormality otherwise identified on CT.  We will check basic labs and EKG as well for stroke work-up.  EKG is unremarkable.  Patient CTA is unremarkable.  Patient's basic labs are unremarkable.  Patient will be admitted for echocardiogram and ongoing telemetry monitoring.  I have discussed the case with hospitalist who has agreed to admit.    DISPOSITION AND DISCUSSIONS  I have discussed management of the patient with the following physicians and SOLANGE's: Hospitalist      FINAL DIAGNOSIS  CRAO

## 2023-04-26 ENCOUNTER — PHARMACY VISIT (OUTPATIENT)
Dept: PHARMACY | Facility: MEDICAL CENTER | Age: 73
End: 2023-04-26
Payer: COMMERCIAL

## 2023-04-26 VITALS
SYSTOLIC BLOOD PRESSURE: 133 MMHG | WEIGHT: 229.06 LBS | TEMPERATURE: 98.6 F | RESPIRATION RATE: 18 BRPM | DIASTOLIC BLOOD PRESSURE: 73 MMHG | HEART RATE: 70 BPM | OXYGEN SATURATION: 91 %

## 2023-04-26 PROBLEM — R73.03 PREDIABETES: Status: ACTIVE | Noted: 2023-04-26

## 2023-04-26 LAB — ERYTHROCYTE [SEDIMENTATION RATE] IN BLOOD BY WESTERGREN METHOD: 3 MM/HOUR (ref 0–20)

## 2023-04-26 PROCEDURE — 99222 1ST HOSP IP/OBS MODERATE 55: CPT | Mod: GC | Performed by: FAMILY MEDICINE

## 2023-04-26 PROCEDURE — A9270 NON-COVERED ITEM OR SERVICE: HCPCS | Performed by: STUDENT IN AN ORGANIZED HEALTH CARE EDUCATION/TRAINING PROGRAM

## 2023-04-26 PROCEDURE — 700102 HCHG RX REV CODE 250 W/ 637 OVERRIDE(OP): Performed by: STUDENT IN AN ORGANIZED HEALTH CARE EDUCATION/TRAINING PROGRAM

## 2023-04-26 PROCEDURE — 700102 HCHG RX REV CODE 250 W/ 637 OVERRIDE(OP): Performed by: FAMILY MEDICINE

## 2023-04-26 PROCEDURE — RXMED WILLOW AMBULATORY MEDICATION CHARGE: Performed by: STUDENT IN AN ORGANIZED HEALTH CARE EDUCATION/TRAINING PROGRAM

## 2023-04-26 PROCEDURE — A9270 NON-COVERED ITEM OR SERVICE: HCPCS | Performed by: FAMILY MEDICINE

## 2023-04-26 RX ORDER — QUINAPRIL 5 1/1
5 TABLET ORAL 2 TIMES DAILY
Qty: 60 TABLET | Refills: 0 | Status: SHIPPED | OUTPATIENT
Start: 2023-04-26 | End: 2023-04-26

## 2023-04-26 RX ORDER — LISINOPRIL 10 MG/1
10 TABLET ORAL DAILY
Qty: 30 TABLET | Refills: 0 | Status: SHIPPED | OUTPATIENT
Start: 2023-04-27 | End: 2023-05-27

## 2023-04-26 RX ADMIN — OMEPRAZOLE 20 MG: 20 CAPSULE, DELAYED RELEASE ORAL at 05:42

## 2023-04-26 RX ADMIN — CLOPIDOGREL BISULFATE 75 MG: 75 TABLET ORAL at 05:42

## 2023-04-26 RX ADMIN — OXYCODONE HYDROCHLORIDE 20 MG: 20 TABLET, FILM COATED, EXTENDED RELEASE ORAL at 05:42

## 2023-04-26 RX ADMIN — DOCUSATE SODIUM 50 MG AND SENNOSIDES 8.6 MG 2 TABLET: 8.6; 5 TABLET, FILM COATED ORAL at 05:41

## 2023-04-26 RX ADMIN — LISINOPRIL 10 MG: 10 TABLET ORAL at 05:42

## 2023-04-26 NOTE — ASSESSMENT & PLAN NOTE
Hemoglobin A1c 5.9 on 4/25/23  - Lifestyle modifications discussed  - Patient encouraged to follow-up with his primary care physician for longitudinal prediabetic care.

## 2023-04-26 NOTE — PROGRESS NOTES
[facility] PROGRESS NOTE     Attending:   Dr. Remigio [place]    Resident:   Brittany Sutton MD    PATIENT:   William Serrano; [id number]; 1950    William Serrano is a 72 y.o. male with past medical history significant for cataracts, retinal detachment x3, left retinal hemorrhage, L eye blindness, drusen of both optic discs, HC, HTN, and GERD admitted on 4/25/2023 for decreased vision in right eye secondary to retinal artery branch occlusion (CRAO) versus branch retinal artery occlusion (BRAO) .    SUBJECTIVE:   No acute events overnight. He reportedly felt well and was eager to go home.    OBJECTIVE:  Vitals:    04/25/23 2000 04/25/23 2310 04/26/23 0338 04/26/23 0847   BP: 137/65 (!) 140/70 102/53 133/73   Pulse:  70 65 70   Resp:  18 18 18   Temp:  36.5 °C (97.7 °F) 36.9 °C (98.5 °F) 37 °C (98.6 °F)   TempSrc:  Temporal Temporal Temporal   SpO2: 93% 98% 95% 91%   Weight:           Intake/Output Summary (Last 24 hours) at 4/26/2023 0619  Last data filed at 4/25/2023 2310  Gross per 24 hour   Intake 200 ml   Output 200 ml   Net 0 ml       PHYSICAL EXAM:  General: No acute distress, afebrile, resting comfortably, conversational   HEENT: NC/AT. EOMI.   Cardiovascular: RRR without murmurs, rubs, heaves. Normal capillary refill   Respiratory: CTAB, no tachypnea or retractions   Abdomen: normal bowel sounds, soft, nontender, nondistended, no masses, no organomegaly   EXT:  DAVALOS, no edema  Skin: No erythema/lesions   Neuro: Non-focal, alert and orientated       LABS:  Recent Labs     04/25/23  1350   WBC 6.1   RBC 4.69*   HEMOGLOBIN 14.3   HEMATOCRIT 44.1   MCV 94.0   MCH 30.5   RDW 43.8   PLATELETCT 239   MPV 9.2   NEUTSPOLYS 46.10   LYMPHOCYTES 39.00   MONOCYTES 9.80   EOSINOPHILS 4.10   BASOPHILS 0.50     Recent Labs     04/25/23  1232   SODIUM 138   POTASSIUM 4.5   CHLORIDE 106   CO2 21   BUN 13   CREATININE 0.82   CALCIUM 9.5   ALBUMIN 4.4     Estimated GFR/CRCL = CrCl cannot be calculated (Unknown  ideal weight.).  Recent Labs     04/25/23  1232   GLUCOSE 94     Recent Labs     04/25/23  1232 04/25/23  1715   ASTSGOT 26  --    ALTSGPT 20  --    TBILIRUBIN 0.6  --    ALKPHOSPHAT 87  --    GLOBULIN 2.6  --    INR  --  1.08             Recent Labs     04/25/23  1715   INR 1.08   APTT 26.3       MICROBIOLOGY:   No results found for: BLOODCULTU, BLDCULT, BCHOLD     IMAGING:   MR-BRAIN-W/O   Final Result      1.  No acute abnormality.   2.  Mild age-appropriate cerebral volume loss.      EC-ECHOCARDIOGRAM COMPLETE W/O CONT   Final Result      CT-CTA NECK WITH & W/O-POST PROCESSING   Final Result      CT angiogram of the neck within normal limits.      CT-CTA HEAD WITH & W/O-POST PROCESS   Final Result      CT angiogram of the [place] within normal limits.          CULTURES:   Results       ** No results found for the last 168 hours. **            MEDS:  Current Facility-Administered Medications   Medication Last Admin    senna-docusate (PERICOLACE or SENOKOT S) 8.6-50 MG per tablet 2 Tablet 2 Tablet at 04/26/23 0541    And    polyethylene glycol/lytes (MIRALAX) PACKET 1 Packet      And    magnesium hydroxide (MILK OF MAGNESIA) suspension 30 mL      And    bisacodyl (DULCOLAX) suppository 10 mg      ondansetron (ZOFRAN) syringe/vial injection 4 mg      ondansetron (ZOFRAN ODT) dispertab 4 mg      labetalol (NORMODYNE/TRANDATE) injection 10 mg      rosuvastatin (CRESTOR) tablet 40 mg 40 mg at 04/25/23 1701    clopidogrel (PLAVIX) tablet 75 mg 75 mg at 04/26/23 0542    omeprazole (PRILOSEC) capsule 20 mg 20 mg at 04/26/23 0542    lisinopril (PRINIVIL) tablet 10 mg 10 mg at 04/26/23 0542    oxyCODONE CR (OXYCONTIN) tablet 20 mg 20 mg at 04/26/23 0542    oxyCODONE immediate release (ROXICODONE) tablet 10-20 mg 20 mg at 04/25/23 2110       ASSESSMENT/PLAN: William Serrano is a 72 y.o. male with past medical history significant for cataracts, retinal detachment x3, left retinal hemorrhage, L eye blindness, drusen  of both optic discs, HC, HTN, and GERD admitted on 4/25/2023 for decreased vision in right eye secondary to retinal artery branch occlusion (CRAO) versus branch retinal artery occlusion (BRAO).    * Central retinal artery occlusion- (present on admission)  Assessment & Plan  As per ophthalmology, she is concern for retinal artery branch occlusion of right eye versus possible BRAO due to retinal whitening on exam with edema.  Lead EKG revealed NSR.  CT angiogram of the [place] within normal limits. CT angiogram of the neck within normal limits.  NIH score 1. Decreased concern for giant cell arteritis and hypercoagulability based on results of blood work.  - Echocardiogram WNL   - Brain MRI WO revealed no acute abnormality.  - ESR WNL, CRP WNL, CBC WNL, PT/PTT WNL.  - Patient to be referred to outpatient cardiology to discuss Zio patch to assess for AF not captured while in patient  -ABCD2 score 3 points: Low risk. 2-day stroke risk 1%/7 day stroke risk: 1.2%/ 90-day stroke risk 3.1%. Single-agent antiplatelet therapy. As such, will continue home clopidogrel.     Prediabetes  Assessment & Plan  Hemoglobin A1c 5.9 on 4/25/23  - Lifestyle modifications discussed  - Patient encouraged to follow-up with his primary care physician for longitudinal prediabetic care.    Chronic GERD  Assessment & Plan  - Continue home pantoprazole    Essential hypertension  Assessment & Plan  - Continue 5 mg of Accupril twice daily      Core Measures:   Fluids: None  Lines: IV  Abx: None  DVT prophylaxis: SCD  Code Status: DNR/DNI    Disposition: Medically cleared for discharge    Brittany Sutton MD   PGY-2 Family Medicine Resident   [facility]

## 2023-04-26 NOTE — DISCHARGE INSTRUCTIONS
Central Retinal Artery Occlusion, Adult    Central retinal artery occlusion (CRAO) is a blockage in the main blood vessel that carries blood to the retina (central retinal artery). The retina is the part of your eye that senses light and sends signals to the brain that allow you to see.  CRAO can make you lose some or all of your vision. If the condition is not treated within 90 minutes, it can result in permanent vision loss in the affected eye.  What are the causes?  This condition may be caused by:  A blood clot that forms in the artery (thrombus). A clot is blood that has thickened into a gel or solid.  A collection of blood or solid material, such as fat that forms somewhere else and travels to the artery (embolus).  A disease that causes the artery to swell (vasculitis).  An infection of the heart's inner lining (endocarditis).  An injury to the artery.  Sometimes the cause is not known.  What increases the risk?  This condition is more likely to develop in:  Males.  Adults who are age 60 or older.  Women who take birth control pills.  Pregnant women.  People who have certain medical conditions, including:  A blood vessel disease that causes narrowing of the arteries (atherosclerosis).  High levels of fat in the blood (hyperlipidemia).  Giant cell arteritis.  Diabetes.  Heart disease.  Sickle cell disease.  High blood pressure (hypertension).  A heart rhythm problem (atrial fibrillation).  A heart valve condition.  A blood-clotting disorder (coagulopathy).  Inflammation of blood vessels (vasculitis).  What are the signs or symptoms?  The only symptom of this condition is sudden and painless vision loss.  How is this diagnosed?  This condition is usually diagnosed by a health care provider who specializes in eye diseases (ophthalmologist). To diagnose the condition, the ophthalmologist will do an eye exam. She or he may also:  Do an exam that involves having eye drops put in the eye (slit lamp exam or dilated  fundus exam).  Order tests, including:  A vision test.  A measurement of the pressure inside your eye.  A test that checks the nerves in your retina (electroretinogram).  A fluorescein angiogram. In this test, pictures are taken of your retina after a dye is injected into your bloodstream.  Optical coherence tomography. In this test, light waves are used to create pictures of your retina.  Check your blood pressure.  Order other tests to find the cause of the condition. These tests may include:  Blood tests to check for vasculitis or coagulopathy.  An ultrasound to check for heart disease or atherosclerosis.  How is this treated?  Treatment for this condition may include:  Massaging the eye.  Medicines, such as steroids to treat any underlying inflammation.  Breathing in a mixture of oxygen and carbon dioxide. This widens (dilates) the arteries of your retina.  Having fluid removed from the front of your eye.  Using drops to reduce pressure in your eye.  An injection of a clot-busting medicine.  You may be referred to a specialist or receive additional treatment if any of these apply:  Your condition was caused by a blood clot that formed outside your eye.  Your optic nerve is swollen.  You have symptoms of a condition called giant cell arteritis. Symptoms of this condition include:  Headache.  Pain with chewing.  Scalp tenderness.  Recent poor appetite and weight loss.  Follow these instructions at home:  Use over-the-counter and prescription medicines only as told by your health care provider. These include any eye drops.  If you lose some vision again:  Breathe into a paper bag.  Massage your eye as directed.  Get medical care right away.  Keep all follow-up visits as told by your health care provider. This is important.  How is this prevented?  To help prevent this condition:  Work with your health care providers to reduce your risk factors.  Do not use any products that contain nicotine or tobacco, such as  cigarettes and e-cigarettes. If you need help quitting, ask your health care provider.  Maintain a healthy weight.  Follow a heart-healthy diet. This diet includes a lot of fruits, vegetables, grains, and lean protein. It is low in saturated fat and sugar.  Exercise regularly.  Contact a health care provider if:  You have any changes in your vision.  Get help right away if:  You have eye pain.  You have new or sudden vision loss.  Summary  Central retinal artery occlusion (CRAO) is a blockage in the main blood vessel that carries blood to the retina (central retinal artery). The retina is the part of your eye that senses light and sends signals to the brain that allow you to see.  CRAO can make you lose some or all of your vision. If the condition is not treated within 90 minutes, it can result in permanent vision loss in the affected eye.  Treatment for this condition may include medicines, eye massage, eye drops, and certain procedures.  Get help right away if you have new or sudden vision loss.  This information is not intended to replace advice given to you by your health care provider. Make sure you discuss any questions you have with your health care provider.  Document Released: 04/01/2002 Document Revised: 11/30/2018 Document Reviewed: 02/23/2018  Elsevier Patient Education © 2020 [employer]

## 2023-04-26 NOTE — PROGRESS NOTES
4 Eyes Skin Assessment Completed by RALPH Abel and RALPH Angel.    Head WDL  Ears WDL  Nose WDL  Mouth WDL  Neck WDL  Breast/Chest WDL  Shoulder Blades WDL  Spine WDL  (R) Arm/Elbow/Hand WDL  (L) Arm/Elbow/Hand WDL  Abdomen WDL  Groin WDL  Scrotum/Coccyx/Buttocks WDL  (R) Leg WDL  (L) Leg WDL  (R) Heel/Foot/Toe WDL  (L) Heel/Foot/Toe WDL          Devices In Places Tele Box and Pulse Ox      Interventions In Place Pillows and Pressure Redistribution Mattress    Possible Skin Injury No    Pictures Uploaded Into Epic N/A  Wound Consult Placed N/A  RN Wound Prevention Protocol Ordered No

## 2023-04-26 NOTE — PROGRESS NOTES
D/c instructions given, educated on worsening s/s. Pt understands and questions answered. D/c home with wife

## 2023-04-26 NOTE — DISCHARGE PLANNING
Case Management Discharge Planning    Admission Date: 4/25/2023  GMLOS: 2  ALOS: 1    6-Clicks ADL Score: 24  6-Clicks Mobility Score: 24      Anticipated Discharge Dispo: Discharge Disposition: Discharged to home/self care (01)    DME Needed: {DME Needed:61301}    Action(s) Taken: {DC Action:66436}    Escalations Completed: {DC Escalations:60328}    Medically Clear: {YES / NO:34456}    Next Steps: ***    Barriers to Discharge: {DC Barriers:56207}    Is the patient up for discharge tomorrow: {Is Patient Up for Discharge Tomorrow:24937}

## 2023-04-26 NOTE — PROGRESS NOTES
Telemetry Shift Summary     Rhythm:Sinus Rhythm to Sinus Bradycardia  HR: 49-86  Ectopy: r pvcs              Normal values:   Rhythm: SR  HR:   Measurements: 0.12-0.20/0.08-0.10/0.30-0.52

## 2023-04-26 NOTE — CARE PLAN
Patient stable; A/O x 4; vision impaired; complained of back pain; oxycodone tab given as ordered; safety measures explained; advised to use call light    The patient is Stable - Low risk of patient condition declining or worsening    Shift Goals  Clinical Goals: monitor VS; no eye pain; improve vision  Patient Goals: rest    Progress made toward(s) clinical / shift goals:    Problem: Optimal Care of the Stroke Patient  Goal: Optimal emergency care for the stroke patient  Outcome: Progressing  Goal: Optimal acute care for the stroke patient  Outcome: Progressing     Problem: Knowledge Deficit - Stroke Education  Goal: Patient's knowledge of stroke and risk factors will improve  Outcome: Progressing     Problem: Psychosocial - Patient Condition  Goal: Patient's ability to verbalize feelings about condition will improve  Outcome: Progressing  Goal: Patient's ability to re-evaluate and adapt role responsibilities will improve  Outcome: Progressing     Problem: Discharge Planning - Stroke  Goal: Ensure Stroke Core Measures are met prior to discharge  Outcome: Progressing  Goal: Patient’s continuum of care needs will be met  Outcome: Progressing     Problem: Neuro Status  Goal: Neuro status will remain stable or improve  Outcome: Progressing     Problem: Hemodynamic Monitoring  Goal: Patient's hemodynamics, fluid balance and neurologic status will be stable or improve  Outcome: Progressing     Problem: Respiratory - Stroke Patient  Goal: Patient will achieve/maintain optimum respiratory rate/effort  Outcome: Progressing     Problem: Dysphagia  Goal: Dysphagia will improve  Outcome: Progressing     Problem: Risk for Aspiration  Goal: Patient's risk for aspiration will be absent or decrease  Outcome: Progressing     Problem: Urinary Elimination  Goal: Establish and maintain regular urinary output  Outcome: Progressing     Problem: Bowel Elimination  Goal: Establish and maintain regular bowel function  Outcome:  Progressing     Problem: Mobility - Stroke  Goal: Patient's capacity to carry out activities will improve  Outcome: Progressing  Goal: Spasticity will be prevented or improved  Outcome: Progressing  Goal: Subluxation will be prevented or improved  Outcome: Progressing     Problem: Self Care  Goal: Patient will have the ability to perform ADLs independently or with assistance (bathe, groom, dress, toilet and feed)  Outcome: Progressing     Problem: Knowledge Deficit - Standard  Goal: Patient and family/care givers will demonstrate understanding of plan of care, disease process/condition, diagnostic tests and medications  Outcome: Progressing     Problem: Pain - Standard  Goal: Alleviation of pain or a reduction in pain to the patient’s comfort goal  Outcome: Progressing       Patient is not progressing towards the following goals:

## 2023-05-01 ENCOUNTER — TELEPHONE (OUTPATIENT)
Dept: HEALTH INFORMATION MANAGEMENT | Facility: OTHER | Age: 73
End: 2023-05-01
Payer: COMMERCIAL